# Patient Record
(demographics unavailable — no encounter records)

---

## 2024-10-27 NOTE — HISTORY OF PRESENT ILLNESS
[FreeTextEntry5] : 83 y.o patient is here for right knee pain today. States she was seen for her knee 9 years ago but no recent tx or imaging since.

## 2024-10-27 NOTE — ASSESSMENT
[FreeTextEntry1] : The patient is a 82 yo woman presenting with right knee pain of chronic severity. She denies new trauma. She denies paresthesias. She does report weakness and instability when going up and down hills or stairs. She has pain with ADLs and walking distances. She feels stiffness with disuse. She has pain at night and at rest. Her pain is consistent with OA. She has not had any recent treatment.   Right knee exam: Well appearing female in no apparent distress. No rashes, scars, or abrasions. Neurovascularly intact. Tender to palpation at medial joint line with 1+ effusion. Ranging from 5 - 115 degrees + Varus deformity. Anterior/posterior drawer negative, - Mcmurrays. - Lachmans. Screening exam of the right hip shows a full, painless ROM.  X-rays done in the office today of the right knee 4 views weightbearing show tricompartmental arthritis with bone-on-bone changes of the medial compartment and varus deformity.  There are no obvious tumors, mass or calcifications seen.  The plan at this time is for her to wear a knee brace and do physical therapy.  We can consider cortisone and/or gel injections in the future.  I will see her back in the office as necessary.

## 2024-10-27 NOTE — ASSESSMENT
[FreeTextEntry1] : The patient is a 84 yo woman presenting with right knee pain of chronic severity. She denies new trauma. She denies paresthesias. She does report weakness and instability when going up and down hills or stairs. She has pain with ADLs and walking distances. She feels stiffness with disuse. She has pain at night and at rest. Her pain is consistent with OA. She has not had any recent treatment.   Right knee exam: Well appearing female in no apparent distress. No rashes, scars, or abrasions. Neurovascularly intact. Tender to palpation at medial joint line with 1+ effusion. Ranging from 5 - 115 degrees + Varus deformity. Anterior/posterior drawer negative, - Mcmurrays. - Lachmans. Screening exam of the right hip shows a full, painless ROM.  X-rays done in the office today of the right knee 4 views weightbearing show tricompartmental arthritis with bone-on-bone changes of the medial compartment and varus deformity.  There are no obvious tumors, mass or calcifications seen.  The plan at this time is for her to wear a knee brace and do physical therapy.  We can consider cortisone and/or gel injections in the future.  I will see her back in the office as necessary.

## 2024-11-14 NOTE — ASSESSMENT
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 The risks, benefits, and alternatives to care were discussed with the patient and understanding expressed. Patient understands this lesion may be cancerous. Patient has significant cardiac history and will need both medical and cardiac clearance. We also discussed if this was may be cancer. We discussed RT and prefers TLM.

## 2024-11-14 NOTE — REASON FOR VISIT
[Subsequent Evaluation] : a subsequent evaluation for [FreeTextEntry2] : dysphonia, left vocal fold leukoplakia, and thymoma.

## 2024-11-14 NOTE — HISTORY OF PRESENT ILLNESS
[de-identified] : MONICA CURIEL is a 83 year old woman who presents to the [facility] with dysphonia, left vocal fold leukoplakia, and thymoma. Last seen 5/2/24. At that time, I had recommended surgery for leukoplakia after her thymoma surgery, [place] [facility] [place]. Surgery with us was never scheduled.  States voice hoarseness is worse when speaking excessively. States voice occasionally cracks and fades. Reports intermittent dry cough for about a year. Reports constant mid globus sensation. Patient denies dysphagia, odynophagia, dyspnea, pain while speaking, sob when speaking, hemoptysis, neck swelling or throat infections, fevers/chills, unintentional weight loss    Previously reported: difficulty phonating for 7 months and left vocal fold lesion. She is referred by Dr. Garfield Sue on 4/30/24 at which point he noted a left anterior vocal fold lesion. Has thymoma. Was seen by Dr. Sue for eval of larynx prior to surgery for thymoma [place] [facility] [place] She denies periods of normal voicing. Describes voice as hoarse and cracking voice. Reports constant mid globus sensation. She denies specific difficulties with swallowing. She denies frequent classic heartburn symptoms. Smoking history: Former smoker for 3 years in her early 20's Patient denies dysphagia, odynophagia, dyspnea, complete loss of voice, throat pain, pain while voicing, cough, SOB when speaking, weight loss, fevers or recent infections. 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

## 2024-11-14 NOTE — HISTORY OF PRESENT ILLNESS
[de-identified] : MONICA CURIEL is a 83 year old woman who presents to the [facility] with dysphonia, left vocal fold leukoplakia, and thymoma. Last seen 5/2/24. At that time, I had recommended surgery for leukoplakia after her thymoma surgery, which was to be done at [facility] [place] end of May 2024. Surgery with us was never scheduled.  States voice hoarseness is worse when speaking excessively. States voice occasionally cracks and fades. Reports intermittent dry cough for about a year. Reports constant mid globus sensation. Patient denies dysphagia, odynophagia, dyspnea, pain while speaking, sob when speaking, hemoptysis, neck swelling or throat infections, fevers/chills, unintentional weight loss    Previously reported: difficulty phonating for 7 months and left vocal fold lesion. She is referred by Dr. Garfield Sue on 4/30/24 at which point he noted a left anterior vocal fold lesion. Has thymoma. Was seen by Dr. Sue for eval of larynx prior to surgery for thymoma (surgery for end of May at [facility] [place]). She denies periods of normal voicing. Describes voice as hoarse and cracking voice. Reports constant mid globus sensation. She denies specific difficulties with swallowing. She denies frequent classic heartburn symptoms. Smoking history: Former smoker for 3 years in her early 20's Patient denies dysphagia, odynophagia, dyspnea, complete loss of voice, throat pain, pain while voicing, cough, SOB when speaking, weight loss, fevers or recent infections. 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

## 2024-11-14 NOTE — PROCEDURE
[de-identified] : Stroboscopic Laryngoscopy Procedure Note: Indications: Assess laryngeal biomechanics and vocal fold oscillation. Description of Procedure: Informed consent was verbally obtained from the patient prior to the procedure. The patient was seated in the clinic chair. Topical anesthesia was achieved by first spraying the nasal cavities with 4% lidocaine and nasal decongestant. Findings: Supraglottis: no masses or lesions Glottis:  Vocal cords:                       Right: crisp and shows no lesions or masses                       Left:  exophytic lesion mid left vocal fold vibratory edge                Mobility:                       Right:  normal                       Left:  normal                Amplitude:                       Right:  normal                       Left:  decreased                Closure: hourglass                Wave symmetry:  asymmetric Subglottis: no masses or lesions within the visualized subglottis. Visualized airway is widely patent.

## 2024-11-14 NOTE — PROCEDURE
[de-identified] : Stroboscopic Laryngoscopy Procedure Note: Indications: Assess laryngeal biomechanics and vocal fold oscillation. Description of Procedure: Informed consent was verbally obtained from the patient prior to the procedure. The patient was seated in the clinic chair. Topical anesthesia was achieved by first spraying the nasal cavities with 4% lidocaine and nasal decongestant. Findings: Supraglottis: no masses or lesions Glottis:  Vocal cords:                       Right: crisp and shows no lesions or masses                       Left:  exophytic lesion mid left vocal fold vibratory edge                Mobility:                       Right:  normal                       Left:  normal                Amplitude:                       Right:  normal                       Left:  decreased                Closure: hourglass                Wave symmetry:  asymmetric Subglottis: no masses or lesions within the visualized subglottis. Visualized airway is widely patent.

## 2025-03-13 NOTE — HISTORY OF PRESENT ILLNESS
[de-identified] : MONICA CURIEL is a 83 year old woman who presents to the [facility] with dysphonia, left vocal fold leukoplakia, thymoma s/p excision, hx of TIA, severe aortic stenosis, hx of afib, on eliquis. Last seen 3/5/25 in OR. 1st POA. Voice continues to be raspy, feels it takes more effort to get voice out. No trouble swallowing or breathing. Coughs occasionally. Also having difficulty hearing mostly through the left ear. no pain, drainage, vertigo, tinnitus. No history of ear surgery or trauma.   Path 3/5/25:  Larynx, left vocal fold, biopsy - Squamous cell carcinoma, well-differentiated (Only seen on permanent section), see comment  Previously reported: difficulty phonating for 7 months and left vocal fold lesion. She is referred by Dr. Garfield Sue on 4/30/24 at which point he noted a left anterior vocal fold lesion. Has thymoma. Was seen by Dr. Sue for eval of larynx prior to surgery for thymoma ([place] of [place] [facility] [place] She denies periods of normal voicing. Describes voice as hoarse and cracking voice. Reports constant mid globus sensation. She denies specific difficulties with swallowing. She denies frequent classic heartburn symptoms. Smoking history: Former smoker for 3 years in her early 20's Patient denies dysphagia, odynophagia, dyspnea, complete loss of voice, throat pain, pain while voicing, cough, SOB when speaking, weight loss, fevers or recent infections.  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  Prior Pertinent Procedures: 3/5/25: DL, cordectomy type 1, steroid inj; Dr. Bass

## 2025-03-13 NOTE — ASSESSMENT
[FreeTextEntry1] : Assessment/Plan:  #1 T1a left glottic SCCa #2 Dysphonia  Ordered for audio due to AS hearing loss.  This was reviewed today and showed: Down sloping mild to mod SNHL AU.  Although frozen pathology was benign final pathology showed SCCa and as such am not confident that all cancer was excised.  I have recommended we return to OR for direct laryngoscopy, laser cordectomy. The risks, benefits, and alternatives to care were discussed with the patient and understanding expressed. Patient in agreement.   Patient to start inhaled steroid 2 puffs bid.    Will see back 1 week pos op.

## 2025-03-13 NOTE — HISTORY OF PRESENT ILLNESS
[de-identified] : MONICA CURIEL is a 83 year old woman who presents to the [facility] with dysphonia, left vocal fold leukoplakia, thymoma s/p excision, hx of TIA, severe aortic stenosis, hx of afib, on eliquis. Last seen 3/5/25 in OR. 1st POA. Voice continues to be raspy, feels it takes more effort to get voice out. No trouble swallowing or breathing. Coughs occasionally. Also having difficulty hearing mostly through the left ear. no pain, drainage, vertigo, tinnitus. No history of ear surgery or trauma.   Path 3/5/25:  Larynx, left vocal fold, biopsy - Squamous cell carcinoma, well-differentiated (Only seen on permanent section), see comment  Previously reported: difficulty phonating for 7 months and left vocal fold lesion. She is referred by Dr. Garfield Sue on 4/30/24 at which point he noted a left anterior vocal fold lesion. Has thymoma. Was seen by Dr. Sue for eval of larynx prior to surgery for thymoma (surgery for end of May at [facility] [place] She denies periods of normal voicing. Describes voice as hoarse and cracking voice. Reports constant mid globus sensation. She denies specific difficulties with swallowing. She denies frequent classic heartburn symptoms. Smoking history: Former smoker for 3 years in her early 20's Patient denies dysphagia, odynophagia, dyspnea, complete loss of voice, throat pain, pain while voicing, cough, SOB when speaking, weight loss, fevers or recent infections.  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  Prior Pertinent Procedures: 3/5/25: DL, cordectomy type 1, steroid inj; Dr. Bass

## 2025-03-13 NOTE — PROCEDURE
[de-identified] : Stroboscopic Laryngoscopy Procedure Note:  Indication:	Assess laryngeal biomechanics and vocal fold oscillation.  Description of Procedure:	Informed consent was verbally obtained from the patient prior to the procedure. The patient was seated in the clinic chair. Topical anesthesia was achieved by first spraying the nasal cavities with 4% lidocaine and nasal decongestant.   Findings:  Supraglottis: no masses or lesions  Glottis:    Structure:                        Right: crisp and shows no lesions or masses                        Left:  mild edema, granulation tissue posterior                 Mobility:                        Right:  normal                        Left:  normal                Amplitude:                        Right:  normal                       Left:  decreased                Closure: complete                 Wave symmetry:  asymmetric  Subglottis: no masses or lesions within the visualized subglottis Visualized airway is widely patent.

## 2025-03-13 NOTE — PROCEDURE
[de-identified] : Stroboscopic Laryngoscopy Procedure Note:  Indication:	Assess laryngeal biomechanics and vocal fold oscillation.  Description of Procedure:	Informed consent was verbally obtained from the patient prior to the procedure. The patient was seated in the clinic chair. Topical anesthesia was achieved by first spraying the nasal cavities with 4% lidocaine and nasal decongestant.   Findings:  Supraglottis: no masses or lesions  Glottis:    Structure:                        Right: crisp and shows no lesions or masses                        Left:  mild edema, granulation tissue posterior                 Mobility:                        Right:  normal                        Left:  normal                Amplitude:                        Right:  normal                       Left:  decreased                Closure: complete                 Wave symmetry:  asymmetric  Subglottis: no masses or lesions within the visualized subglottis Visualized airway is widely patent.

## 2025-03-27 NOTE — HISTORY OF PRESENT ILLNESS
[de-identified] : MONICA CURIEL is a 84 year old woman who presents to the [facility] with dysphonia and T1a left glottic SCCa. Last seen 3/19/25 in OR for cordectomy. 1st POA. Using inhaled steroid 2 puffs bid. Voice has been more hoarse since procedure. Has some odynophagia. No difficulty swallowing or breathing. No fevers or chills.   Path 3/19/25: pending  Path 3/5/25: Larynx, left vocal fold, biopsy - Squamous cell carcinoma, well-differentiated (Only seen on permanent section), see comment  Previously reported: difficulty phonating for 7 months and left vocal fold lesion. She is referred by Dr. Garfield Sue on 4/30/24 at which point he noted a left anterior vocal fold lesion. Has thymoma. Was seen by Dr. Sue for eval of larynx prior to surgery for thymoma (surgery for end of May at [facility] [place] She denies periods of normal voicing. Describes voice as hoarse and cracking voice. Reports constant mid globus sensation. She denies specific difficulties with swallowing. She denies frequent classic heartburn symptoms. Smoking history: Former smoker for 3 years in her early 20's Patient denies dysphagia, odynophagia, dyspnea, complete loss of voice, throat pain, pain while voicing, cough, SOB when speaking, weight loss, fevers or recent infections.  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  Prior Pertinent Procedures: 3/5/25: DL, cordectomy type 1, steroid inj; Dr. Bass 3/19/25: DL, type 3 cordectomy left cord; Dr. Bass

## 2025-03-27 NOTE — HISTORY OF PRESENT ILLNESS
[de-identified] : MONICA CURIEL is a 84 year old woman who presents to the [facility] with dysphonia and T1a left glottic SCCa. Last seen 3/19/25 in OR for cordectomy. 1st POA. Using inhaled steroid 2 puffs bid. Voice has been more hoarse since procedure. Has some odynophagia. No difficulty swallowing or breathing. No fevers or chills.   Path 3/19/25: pending  Path 3/5/25: Larynx, left vocal fold, biopsy - Squamous cell carcinoma, well-differentiated (Only seen on permanent section), see comment  Previously reported: difficulty phonating for 7 months and left vocal fold lesion. She is referred by Dr. Garfield Sue on 4/30/24 at which point he noted a left anterior vocal fold lesion. Has thymoma. Was seen by Dr. Sue for eval of larynx prior to surgery for thymoma ([place] of [place] [facility] [place] She denies periods of normal voicing. Describes voice as hoarse and cracking voice. Reports constant mid globus sensation. She denies specific difficulties with swallowing. She denies frequent classic heartburn symptoms. Smoking history: Former smoker for 3 years in her early 20's Patient denies dysphagia, odynophagia, dyspnea, complete loss of voice, throat pain, pain while voicing, cough, SOB when speaking, weight loss, fevers or recent infections.  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  Prior Pertinent Procedures: 3/5/25: DL, cordectomy type 1, steroid inj; Dr. Bass 3/19/25: DL, type 3 cordectomy left cord; Dr. Bass

## 2025-03-27 NOTE — PROCEDURE
[de-identified] : Laryngoscopy: Stroboscopic Laryngoscopy Procedure Note: Indication: Assess laryngeal biomechanics and vocal fold oscillation. Description of Procedure: Informed consent was verbally obtained from the patient prior to the procedure. The patient was seated in the clinic chair. Topical anesthesia was achieved by first spraying the nasal cavities with 4% lidocaine and nasal decongestant.  Findings: Supraglottis: no masses or lesions Glottis: Structure:  Right: crisp and shows no lesions or masses  Left: white mucoid scab throughout  Mobility:  Right: normal  Left: normal  Amplitude:  Right: normal  Left: decreased  Closure: complete  Wave symmetry: asymmetric Subglottis: no masses or lesions within the visualized subglottis Visualized airway is widely patent.

## 2025-03-27 NOTE — PROCEDURE
[de-identified] : Laryngoscopy: Stroboscopic Laryngoscopy Procedure Note: Indication: Assess laryngeal biomechanics and vocal fold oscillation. Description of Procedure: Informed consent was verbally obtained from the patient prior to the procedure. The patient was seated in the clinic chair. Topical anesthesia was achieved by first spraying the nasal cavities with 4% lidocaine and nasal decongestant.  Findings: Supraglottis: no masses or lesions Glottis: Structure:  Right: crisp and shows no lesions or masses  Left: white mucoid scab throughout  Mobility:  Right: normal  Left: normal  Amplitude:  Right: normal  Left: decreased  Closure: complete  Wave symmetry: asymmetric Subglottis: no masses or lesions within the visualized subglottis Visualized airway is widely patent.

## 2025-03-27 NOTE — ASSESSMENT
[FreeTextEntry1] : Assessment/Plan: #1 T1a left glottic SCCa #2 Dysphonia  Patient to continue inhaled steroid 2 puffs bid. Will follow up in 3 weeks.  I will call with pathology results.

## 2025-04-22 NOTE — PROCEDURE
[de-identified] : Stroboscopic Laryngoscopy Procedure Note:  Indication:	Assess laryngeal biomechanics and vocal fold oscillation.  Description of Procedure:	Informed consent was verbally obtained from the patient prior to the procedure. The patient was seated in the clinic chair. Topical anesthesia was achieved by first spraying the nasal cavities with 4% lidocaine and nasal decongestant.   Findings:  Supraglottis: no masses or lesions  Glottis:    Structure:                        Right: crisp and shows no lesions or masses                        Left:  scar throughout                Mobility:                        Right:  normal                        Left:  normal                Amplitude:                        Right:  normal                       Left:  decreased throughout                Closure: complete                 Wave symmetry:  asymmetric- dancing Subglottis: no masses or lesions within the visualized subglottis Visualized airway is widely patent.

## 2025-04-22 NOTE — PROCEDURE
[de-identified] : Stroboscopic Laryngoscopy Procedure Note:  Indication:	Assess laryngeal biomechanics and vocal fold oscillation.  Description of Procedure:	Informed consent was verbally obtained from the patient prior to the procedure. The patient was seated in the clinic chair. Topical anesthesia was achieved by first spraying the nasal cavities with 4% lidocaine and nasal decongestant.   Findings:  Supraglottis: no masses or lesions  Glottis:    Structure:                        Right: crisp and shows no lesions or masses                        Left:  scar throughout                Mobility:                        Right:  normal                        Left:  normal                Amplitude:                        Right:  normal                       Left:  decreased throughout                Closure: complete                 Wave symmetry:  asymmetric- dancing Subglottis: no masses or lesions within the visualized subglottis Visualized airway is widely patent.

## 2025-04-22 NOTE — ASSESSMENT
[FreeTextEntry1] : Assessment/Plan: #1 T1a left glottic SCCa #2 Dysphonia  May discontinue inhaled steroid.  Will follow up in 1 month.

## 2025-04-22 NOTE — HISTORY OF PRESENT ILLNESS
[de-identified] : MONICA CURIEL is a 84 year old woman who presents to the [facility] with dysphonia and T1a left glottic SCCa. Last seen 3/27/25. 2nd POA. Was to continue inhaled steroid 2 puffs bid and follow up in 3 weeks.   Path 3/19/25:  1. Larynx, left vocal fold, excision: Laryngeal tissue with central defect on surface epithelium accompanying by acute and chronic inflammation, and granulation tissue formation consistent with prior surgical site. Adjacent squamous epithelium focally with cluster of highly atypical cells, which are getting smaller on the additional serial section and deep levels, compatible with mild to moderate dysplasia, which is 0.5 mm from lateral margin at posterior aspect, close and long-term clinical follow-up is recommended, see comment  Path 3/5/25: Larynx, left vocal fold, biopsy: Squamous cell carcinoma, well-differentiated (Only seen on permanent section), see comment  Previously reported: difficulty phonating for 7 months and left vocal fold lesion. She is referred by Dr. Garfield Sue on 4/30/24 at which point he noted a left anterior vocal fold lesion. Has thymoma. Was seen by Dr. Sue for eval of larynx prior to surgery for thymoma (surgery for end of May at [facility] [place] She denies periods of normal voicing. Describes voice as hoarse and cracking voice. Reports constant mid globus sensation. She denies specific difficulties with swallowing. She denies frequent classic heartburn symptoms. Smoking history: Former smoker for 3 years in her early 20's Patient denies dysphagia, odynophagia, dyspnea, complete loss of voice, throat pain, pain while voicing, cough, SOB when speaking, weight loss, fevers or recent infections.  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  Prior Pertinent Procedures: 3/5/25: DL, cordectomy type 1, steroid inj; Dr. Bass 3/19/25: DL, type 3 cordectomy left cord; Dr. Bass

## 2025-04-22 NOTE — HISTORY OF PRESENT ILLNESS
[de-identified] : MONICA CURIEL is a 84 year old woman who presents to the [facility] with dysphonia and T1a left glottic SCCa. Last seen 3/27/25. 2nd POA. Was to continue inhaled steroid 2 puffs bid and follow up in 3 weeks.   Path 3/19/25:  1. Larynx, left vocal fold, excision: Laryngeal tissue with central defect on surface epithelium accompanying by acute and chronic inflammation, and granulation tissue formation consistent with prior surgical site. Adjacent squamous epithelium focally with cluster of highly atypical cells, which are getting smaller on the additional serial section and deep levels, compatible with mild to moderate dysplasia, which is 0.5 mm from lateral margin at posterior aspect, close and long-term clinical follow-up is recommended, see comment  Path 3/5/25: Larynx, left vocal fold, biopsy: Squamous cell carcinoma, well-differentiated (Only seen on permanent section), see comment  Previously reported: difficulty phonating for 7 months and left vocal fold lesion. She is referred by Dr. Garfield Sue on 4/30/24 at which point he noted a left anterior vocal fold lesion. Has thymoma. Was seen by Dr. Sue for eval of larynx prior to surgery for thymoma (surgery for end of May at [facility] [place] She denies periods of normal voicing. Describes voice as hoarse and cracking voice. Reports constant mid globus sensation. She denies specific difficulties with swallowing. She denies frequent classic heartburn symptoms. Smoking history: Former smoker for 3 years in her early 20's Patient denies dysphagia, odynophagia, dyspnea, complete loss of voice, throat pain, pain while voicing, cough, SOB when speaking, weight loss, fevers or recent infections.  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  Prior Pertinent Procedures: 3/5/25: DL, cordectomy type 1, steroid inj; Dr. Bass 3/19/25: DL, type 3 cordectomy left cord; Dr. Bass

## 2025-05-15 NOTE — REASON FOR VISIT
[Subsequent Evaluation] : a subsequent evaluation for [FreeTextEntry2] :  dysphonia and T1a left glottic SCCa.

## 2025-05-15 NOTE — HISTORY OF PRESENT ILLNESS
[de-identified] :  MONICA CURIEL is a 84 year old woman who presents to the [facility] with dysphonia and T1a left glottic SCCa. Last seen 4/22/25. Was to discontinue inhaled steroid and follow up in 1mth.  No longer on steroid inhaler  States voice was improving daily but continues to have voice hoarseness and believes voice hoarsness is stagnant  Breathing is stable  Occasionally has globus sensation when swallowing.  Patient denies dysphagia, odynophagia, dyspnea, throat pain, complete loss of voice, pain while speaking, sob when speaking, hemoptysis, cough, neck swelling or throat infections    Path 3/19/25: 1. Larynx, left vocal fold, excision: Laryngeal tissue with central defect on surface epithelium accompanying by acute and chronic inflammation, and granulation tissue formation consistent with prior surgical site. Adjacent squamous epithelium focally with cluster of highly atypical cells, which are getting smaller on the additional serial section and deep levels, compatible with mild to moderate dysplasia, which is 0.5 mm from lateral margin at posterior aspect, close and long-term clinical follow-up is recommended, see comment  Path 3/5/25: Larynx, left vocal fold, biopsy: Squamous cell carcinoma, well-differentiated (Only seen on permanent section), see comment  Previously reported: difficulty phonating for 7 months and left vocal fold lesion. She is referred by Dr. Garfield Sue on 4/30/24 at which point he noted a left anterior vocal fold lesion. Has thymoma. Was seen by Dr. Sue for eval of larynx prior to surgery for thymoma (surgery for end of May at [facility] [place]). She denies periods of normal voicing. Describes voice as hoarse and cracking voice. Reports constant mid globus sensation. She denies specific difficulties with swallowing. She denies frequent classic heartburn symptoms. Smoking history: Former smoker for 3 years in her early 20's Patient denies dysphagia, odynophagia, dyspnea, complete loss of voice, throat pain, pain while voicing, cough, SOB when speaking, weight loss, fevers or recent infections.  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  Prior Pertinent Procedures: 3/5/25: DL, cordectomy type 1, steroid inj; Dr. Bass 3/19/25: DL, type 3 cordectomy left cord; Dr. Bass

## 2025-05-15 NOTE — PROCEDURE
This is Dr. Laura Kwong patient. Please discuss with him. Patricia Martinez. Reshma Lobo MD  Diplomate, American Board of Internal Medicine  Member, American [employer]estyle Medicine  Member, [employer] for [address], [address] [place] [address],[address], [facility], [address]  [phone] (phone); [phone] (fax)  [email or web address] [de-identified] : Stroboscopic Laryngoscopy Procedure Note: Indications: Assess laryngeal biomechanics and vocal fold oscillation. Description of Procedure: Informed consent was verbally obtained from the patient prior to the procedure. The patient was seated in the clinic chair. Topical anesthesia was achieved by first spraying the nasal cavities with 4% lidocaine and nasal decongestant. Findings: Supraglottis: no masses or lesions Glottis:  Vocal cords:                       Right: crisp and shows no lesions or masses                       Left:  scar throughout                Mobility:                       Right:  normal                       Left:  normal                Amplitude:                       Right:  normal                       Left:  decreased                Closure: complete                Wave symmetry:  asymmetric Subglottis: no masses or lesions within the visualized subglottis. Visualized airway is widely patent.

## 2025-05-15 NOTE — ASSESSMENT
[FreeTextEntry1] : Assessment/Plan: #1 History of T1a left glottic SCCa #2 Dysphonia  Will follow up in 1.5 months.

## 2025-05-15 NOTE — PROCEDURE
[de-identified] : Stroboscopic Laryngoscopy Procedure Note: Indications: Assess laryngeal biomechanics and vocal fold oscillation. Description of Procedure: Informed consent was verbally obtained from the patient prior to the procedure. The patient was seated in the clinic chair. Topical anesthesia was achieved by first spraying the nasal cavities with 4% lidocaine and nasal decongestant. Findings: Supraglottis: no masses or lesions Glottis:  Vocal cords:                       Right: crisp and shows no lesions or masses                       Left:  scar throughout                Mobility:                       Right:  normal                       Left:  normal                Amplitude:                       Right:  normal                       Left:  decreased                Closure: complete                Wave symmetry:  asymmetric Subglottis: no masses or lesions within the visualized subglottis. Visualized airway is widely patent.

## 2025-05-15 NOTE — HISTORY OF PRESENT ILLNESS
[de-identified] :  MONICA CURIEL is a 84 year old woman who presents to the [facility] with dysphonia and T1a left glottic SCCa. Last seen 4/22/25. Was to discontinue inhaled steroid and follow up in 1mth.  No longer on steroid inhaler  States voice was improving daily but continues to have voice hoarseness and believes voice hoarsness is stagnant  Breathing is stable  Occasionally has globus sensation when swallowing.  Patient denies dysphagia, odynophagia, dyspnea, throat pain, complete loss of voice, pain while speaking, sob when speaking, hemoptysis, cough, neck swelling or throat infections    Path 3/19/25: 1. Larynx, left vocal fold, excision: Laryngeal tissue with central defect on surface epithelium accompanying by acute and chronic inflammation, and granulation tissue formation consistent with prior surgical site. Adjacent squamous epithelium focally with cluster of highly atypical cells, which are getting smaller on the additional serial section and deep levels, compatible with mild to moderate dysplasia, which is 0.5 mm from lateral margin at posterior aspect, close and long-term clinical follow-up is recommended, see comment  Path 3/5/25: Larynx, left vocal fold, biopsy: Squamous cell carcinoma, well-differentiated (Only seen on permanent section), see comment  Previously reported: difficulty phonating for 7 months and left vocal fold lesion. She is referred by Dr. Garfield Sue on 4/30/24 at which point he noted a left anterior vocal fold lesion. Has thymoma. Was seen by Dr. Sue for eval of larynx prior to surgery for thymoma ([place] [facility] [place] She denies periods of normal voicing. Describes voice as hoarse and cracking voice. Reports constant mid globus sensation. She denies specific difficulties with swallowing. She denies frequent classic heartburn symptoms. Smoking history: Former smoker for 3 years in her early 20's Patient denies dysphagia, odynophagia, dyspnea, complete loss of voice, throat pain, pain while voicing, cough, SOB when speaking, weight loss, fevers or recent infections.  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  Prior Pertinent Procedures: 3/5/25: DL, cordectomy type 1, steroid inj; Dr. Bass 3/19/25: DL, type 3 cordectomy left cord; Dr. Bass

## 2025-07-15 NOTE — REASON FOR VISIT
[Subsequent Evaluation] : a subsequent evaluation for [FreeTextEntry2] :  dysphonia and T1a left glottic SCCa

## 2025-07-15 NOTE — HISTORY OF PRESENT ILLNESS
[de-identified] : MONICA CURIEL is a 84 year old woman who presents to the [facility] with dysphonia and T1a left glottic SCCa. Last seen 5/15/25. She was to follow up in 1.5mths. No longer using steroid inhaler  States voice intermittently hoarse, some days better than others but overall voice is 80% better.  No longer experiencing voice cracking or globus sensation Staying active-plays pickle ball  Recent right tooth infection 6 weeks ago- treated with antibiotics.  Patient denies dysphagia, odynophagia, dyspnea, complete loss of voice, throat pain, pain while voicing, cough, SOB when speaking, weight loss, and fevers.  Path 3/19/25: 1. Larynx, left vocal fold, excision: Laryngeal tissue with central defect on surface epithelium accompanying by acute and chronic inflammation, and granulation tissue formation consistent with prior surgical site. Adjacent squamous epithelium focally with cluster of highly atypical cells, which are getting smaller on the additional serial section and deep levels, compatible with mild to moderate dysplasia, which is 0.5 mm from lateral margin at posterior aspect, close and long-term clinical follow-up is recommended, see comment  Path 3/5/25: Larynx, left vocal fold, biopsy: Squamous cell carcinoma, well-differentiated (Only seen on permanent section), see comment  Previously reported: difficulty phonating for 7 months and left vocal fold lesion. She is referred by Dr. Garfield Sue on 4/30/24 at which point he noted a left anterior vocal fold lesion. Has thymoma. Was seen by Dr. Sue for eval of larynx prior to surgery for thymoma (surgery for end of May at [facility] [place]). She denies periods of normal voicing. Describes voice as hoarse and cracking voice. Reports constant mid globus sensation. She denies specific difficulties with swallowing. She denies frequent classic heartburn symptoms. Smoking history: Former smoker for 3 years in her early 20's Patient denies dysphagia, odynophagia, dyspnea, complete loss of voice, throat pain, pain while voicing, cough, SOB when speaking, weight loss, fevers or recent infections.  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  Prior Pertinent Procedures: 3/5/25: DL, cordectomy type 1, steroid inj; Dr. Bass 3/19/25: DL, type 3 cordectomy left cord; Dr. Bass

## 2025-07-15 NOTE — PROCEDURE
[de-identified] : Stroboscopic Laryngoscopy Procedure Note: Indications: Assess laryngeal biomechanics and vocal fold oscillation. Description of Procedure: Informed consent was verbally obtained from the patient prior to the procedure. The patient was seated in the clinic chair. Topical anesthesia was achieved by first spraying the nasal cavities with 4% lidocaine and nasal decongestant. Findings: Supraglottis: no masses or lesions Glottis: Vocal cords:  Right: crisp and shows no lesions or masses  Left: scar throughout  Mobility:  Right: normal  Left: normal  Amplitude:  Right: normal  Left: decreased  Closure: complete  Wave symmetry: asymmetric Subglottis: no masses or lesions within the visualized subglottis. Visualized airway is widely patent.

## 2025-07-15 NOTE — HISTORY OF PRESENT ILLNESS
[de-identified] : MONICA CURIEL is a 84 year old woman who presents to the [facility] with dysphonia and T1a left glottic SCCa. Last seen 5/15/25. She was to follow up in 1.5mths. No longer using steroid inhaler  States voice intermittently hoarse, some days better than others but overall voice is 80% better.  No longer experiencing voice cracking or globus sensation Staying active-plays pickle ball  Recent right tooth infection 6 weeks ago- treated with antibiotics.  Patient denies dysphagia, odynophagia, dyspnea, complete loss of voice, throat pain, pain while voicing, cough, SOB when speaking, weight loss, and fevers.  Path 3/19/25: 1. Larynx, left vocal fold, excision: Laryngeal tissue with central defect on surface epithelium accompanying by acute and chronic inflammation, and granulation tissue formation consistent with prior surgical site. Adjacent squamous epithelium focally with cluster of highly atypical cells, which are getting smaller on the additional serial section and deep levels, compatible with mild to moderate dysplasia, which is 0.5 mm from lateral margin at posterior aspect, close and long-term clinical follow-up is recommended, see comment  Path 3/5/25: Larynx, left vocal fold, biopsy: Squamous cell carcinoma, well-differentiated (Only seen on permanent section), see comment  Previously reported: difficulty phonating for 7 months and left vocal fold lesion. She is referred by Dr. Garfield Sue on 4/30/24 at which point he noted a left anterior vocal fold lesion. Has thymoma. Was seen by Dr. Sue for eval of larynx prior to surgery for thymoma (surgery for end of May at [facility] [place] She denies periods of normal voicing. Describes voice as hoarse and cracking voice. Reports constant mid globus sensation. She denies specific difficulties with swallowing. She denies frequent classic heartburn symptoms. Smoking history: Former smoker for 3 years in her early 20's Patient denies dysphagia, odynophagia, dyspnea, complete loss of voice, throat pain, pain while voicing, cough, SOB when speaking, weight loss, fevers or recent infections.  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  Prior Pertinent Procedures: 3/5/25: DL, cordectomy type 1, steroid inj; Dr. Bass 3/19/25: DL, type 3 cordectomy left cord; Dr. Bass

## 2025-07-15 NOTE — PROCEDURE
[de-identified] : Stroboscopic Laryngoscopy Procedure Note: Indications: Assess laryngeal biomechanics and vocal fold oscillation. Description of Procedure: Informed consent was verbally obtained from the patient prior to the procedure. The patient was seated in the clinic chair. Topical anesthesia was achieved by first spraying the nasal cavities with 4% lidocaine and nasal decongestant. Findings: Supraglottis: no masses or lesions Glottis: Vocal cords:  Right: crisp and shows no lesions or masses  Left: scar throughout  Mobility:  Right: normal  Left: normal  Amplitude:  Right: normal  Left: decreased  Closure: complete  Wave symmetry: asymmetric Subglottis: no masses or lesions within the visualized subglottis. Visualized airway is widely patent.

## 2025-07-15 NOTE — ASSESSMENT
[FreeTextEntry1] : Assessment/Plan: #1 History of T1a left glottic SCCa #2 Dysphonia  Will follow up in 2.5 months.